# Patient Record
Sex: FEMALE | Race: WHITE | NOT HISPANIC OR LATINO | Employment: UNEMPLOYED | URBAN - METROPOLITAN AREA
[De-identification: names, ages, dates, MRNs, and addresses within clinical notes are randomized per-mention and may not be internally consistent; named-entity substitution may affect disease eponyms.]

---

## 2017-03-23 ENCOUNTER — HOSPITAL ENCOUNTER (OUTPATIENT)
Dept: SLEEP MEDICINE | Facility: HOSPITAL | Age: 52
Discharge: HOME | End: 2017-03-23
Attending: INTERNAL MEDICINE | Admitting: INTERNAL MEDICINE

## 2017-04-13 ENCOUNTER — HOSPITAL ENCOUNTER (OUTPATIENT)
Dept: SLEEP MEDICINE | Facility: HOSPITAL | Age: 52
Discharge: HOME | End: 2017-04-13
Attending: INTERNAL MEDICINE | Admitting: INTERNAL MEDICINE

## 2019-08-23 ENCOUNTER — OFFICE VISIT (OUTPATIENT)
Dept: FAMILY MEDICINE CLINIC | Facility: CLINIC | Age: 54
End: 2019-08-23

## 2019-08-23 VITALS
WEIGHT: 141 LBS | TEMPERATURE: 98.7 F | HEART RATE: 71 BPM | BODY MASS INDEX: 27.68 KG/M2 | DIASTOLIC BLOOD PRESSURE: 90 MMHG | OXYGEN SATURATION: 98 % | SYSTOLIC BLOOD PRESSURE: 146 MMHG | RESPIRATION RATE: 16 BRPM | HEIGHT: 60 IN

## 2019-08-23 DIAGNOSIS — Z12.39 SCREENING FOR BREAST CANCER: ICD-10-CM

## 2019-08-23 DIAGNOSIS — Z01.419 ENCOUNTER FOR ANNUAL ROUTINE GYNECOLOGICAL EXAMINATION: ICD-10-CM

## 2019-08-23 DIAGNOSIS — Z00.00 PREVENTATIVE HEALTH CARE: Primary | ICD-10-CM

## 2019-08-23 PROBLEM — K21.9 GASTROESOPHAGEAL REFLUX DISEASE: Status: ACTIVE | Noted: 2019-08-23

## 2019-08-23 LAB
ALBUMIN SERPL-MCNC: 4.3 G/DL (ref 3.5–4.8)
ALBUMIN/GLOB SERPL: 1.7 G/DL (ref 1–1.7)
ALP SERPL-CCNC: 71 U/L (ref 32–91)
ALT SERPL W P-5'-P-CCNC: 14 U/L (ref 14–54)
ANION GAP SERPL CALCULATED.3IONS-SCNC: 16.4 MMOL/L (ref 5–15)
ARTICHOKE IGE QN: 83 MG/DL (ref 0–100)
AST SERPL-CCNC: 19 U/L (ref 15–41)
BASOPHILS # BLD AUTO: 0 10*3/MM3 (ref 0–0.2)
BASOPHILS NFR BLD AUTO: 0.5 % (ref 0–1.5)
BILIRUB SERPL-MCNC: 0.9 MG/DL (ref 0.3–1.2)
BUN BLD-MCNC: 17 MG/DL (ref 8–20)
BUN/CREAT SERPL: 21.3 (ref 5.4–26.2)
CALCIUM SPEC-SCNC: 9.4 MG/DL (ref 8.9–10.3)
CHLORIDE SERPL-SCNC: 102 MMOL/L (ref 101–111)
CHOLEST SERPL-MCNC: 153 MG/DL
CO2 SERPL-SCNC: 26 MMOL/L (ref 22–32)
CREAT BLD-MCNC: 0.8 MG/DL (ref 0.4–1)
DEPRECATED RDW RBC AUTO: 45.5 FL (ref 37–54)
EOSINOPHIL # BLD AUTO: 0.1 10*3/MM3 (ref 0–0.4)
EOSINOPHIL NFR BLD AUTO: 2.6 % (ref 0.3–6.2)
ERYTHROCYTE [DISTWIDTH] IN BLOOD BY AUTOMATED COUNT: 14.4 % (ref 12.3–15.4)
GFR SERPL CREATININE-BSD FRML MDRD: 75 ML/MIN/1.73
GLOBULIN UR ELPH-MCNC: 2.6 GM/DL (ref 2.5–3.8)
GLUCOSE BLD-MCNC: 94 MG/DL (ref 65–99)
HCT VFR BLD AUTO: 45.9 % (ref 34–46.6)
HDLC SERPL QL: 2.47
HDLC SERPL-MCNC: 62 MG/DL
HGB BLD-MCNC: 15.4 G/DL (ref 12–15.9)
LDLC/HDLC SERPL: 1.33 {RATIO}
LYMPHOCYTES # BLD AUTO: 2 10*3/MM3 (ref 0.7–3.1)
LYMPHOCYTES NFR BLD AUTO: 38.7 % (ref 19.6–45.3)
MCH RBC QN AUTO: 30.2 PG (ref 26.6–33)
MCHC RBC AUTO-ENTMCNC: 33.6 G/DL (ref 31.5–35.7)
MCV RBC AUTO: 89.9 FL (ref 79–97)
MONOCYTES # BLD AUTO: 0.5 10*3/MM3 (ref 0.1–0.9)
MONOCYTES NFR BLD AUTO: 9 % (ref 5–12)
NEUTROPHILS # BLD AUTO: 2.6 10*3/MM3 (ref 1.7–7)
NEUTROPHILS NFR BLD AUTO: 49.2 % (ref 42.7–76)
NRBC BLD AUTO-RTO: 0.1 /100 WBC (ref 0–0.2)
PLATELET # BLD AUTO: 279 10*3/MM3 (ref 140–450)
PMV BLD AUTO: 9 FL (ref 6–12)
POTASSIUM BLD-SCNC: 4.4 MMOL/L (ref 3.6–5.1)
PROT SERPL-MCNC: 6.9 G/DL (ref 6.1–7.9)
RBC # BLD AUTO: 5.11 10*6/MM3 (ref 3.77–5.28)
SODIUM BLD-SCNC: 140 MMOL/L (ref 136–144)
TRIGL SERPL-MCNC: 43 MG/DL
VLDLC SERPL-MCNC: 8.6 MG/DL
WBC NRBC COR # BLD: 5.2 10*3/MM3 (ref 3.4–10.8)

## 2019-08-23 PROCEDURE — 87624 HPV HI-RISK TYP POOLED RSLT: CPT | Performed by: INTERNAL MEDICINE

## 2019-08-23 PROCEDURE — 80053 COMPREHEN METABOLIC PANEL: CPT | Performed by: INTERNAL MEDICINE

## 2019-08-23 PROCEDURE — 85025 COMPLETE CBC W/AUTO DIFF WBC: CPT | Performed by: INTERNAL MEDICINE

## 2019-08-23 PROCEDURE — G0148 SCR C/V CYTO, AUTOSYS, RESCR: HCPCS

## 2019-08-23 PROCEDURE — 82306 VITAMIN D 25 HYDROXY: CPT | Performed by: INTERNAL MEDICINE

## 2019-08-23 PROCEDURE — 99396 PREV VISIT EST AGE 40-64: CPT | Performed by: INTERNAL MEDICINE

## 2019-08-23 PROCEDURE — 80061 LIPID PANEL: CPT | Performed by: INTERNAL MEDICINE

## 2019-08-23 NOTE — PROGRESS NOTES
"Rooming Tab(CC,VS,Pt Hx,Fall Screen)  Chief Complaint   Patient presents with   • Annual Exam   • Gynecologic Exam       Subjective   Pt here for physical- has dropped 40+ lbs this year- goal is 50 pounds sleeping better, feels much better- even nails very long. Down 2 pant sizes.    No chest pain or difficulty, no GERD, no joint pain. No moles changing. Upset BP still up  I have reviewed and updated her medications, medical history and problem list during today's office visit.     Patient Care Team:  Alesia Gutierrez MD as PCP - General    Problem List Tab  Medications Tab  Synopsis Tab  Chart Review Tab  Care Everywhere Tab  Immunizations Tab  Patient History Tab    Social History     Tobacco Use   • Smoking status: Never Smoker   • Smokeless tobacco: Never Used   Substance Use Topics   • Alcohol use: No     Frequency: Never       Review of Systems   Constitutional: Negative for fatigue and fever.   HENT: Negative for congestion.    Respiratory: Negative for apnea, cough and wheezing.    Cardiovascular: Negative for chest pain.   Gastrointestinal: Negative for abdominal distention.   Neurological: Negative for syncope.   Psychiatric/Behavioral: Negative for behavioral problems.       Objective     Rooming Tab(CC,VS,Pt Hx,Fall Screen)  /90 (BP Location: Right arm, Patient Position: Sitting, Cuff Size: Adult)   Pulse 71   Temp 98.7 °F (37.1 °C) (Oral)   Resp 16   Ht 152.4 cm (60\")   Wt 64 kg (141 lb)   SpO2 98%   BMI 27.54 kg/m²     Body mass index is 27.54 kg/m².    Physical Exam   Constitutional: She is oriented to person, place, and time. She appears well-developed and well-nourished.   HENT:   Head: Normocephalic and atraumatic.   Right Ear: Tympanic membrane normal.   Left Ear: Tympanic membrane normal.   Eyes: Pupils are equal, round, and reactive to light.   Neck: Normal range of motion. Neck supple.   Cardiovascular: Normal rate and regular rhythm.   No murmur heard.  Pulmonary/Chest: " Effort normal and breath sounds normal. Right breast exhibits no inverted nipple, no mass and no nipple discharge. Left breast exhibits no inverted nipple, no mass and no nipple discharge.   Abdominal: Soft. Bowel sounds are normal. She exhibits no distension.   Genitourinary: Rectum normal and uterus normal. Cervix exhibits no motion tenderness. Right adnexum displays no mass. Left adnexum displays no mass. No erythema in the vagina.   Genitourinary Comments: Vaginal atrophy   Neurological: She is oriented to person, place, and time.   Skin: Capillary refill takes less than 2 seconds.   Nursing note and vitals reviewed.       Statin Choice Calculator  Data Reviewed:               Lab Results   Component Value Date    BUN 17 08/23/2019    CREATININE 0.80 08/23/2019    EGFRIFNONA 75 08/23/2019     08/23/2019    K 4.4 08/23/2019     08/23/2019    CALCIUM 9.4 08/23/2019    ALBUMIN 4.30 08/23/2019    BILITOT 0.9 08/23/2019    ALKPHOS 71 08/23/2019    AST 19 08/23/2019    ALT 14 08/23/2019    TRIG 43 08/23/2019    HDL 62 08/23/2019    VLDL 8.6 08/23/2019    LDL 83 08/23/2019    LDLHDL 1.33 08/23/2019    WBC 5.20 08/23/2019    RBC 5.11 08/23/2019    HCT 45.9 08/23/2019    MCV 89.9 08/23/2019    MCH 30.2 08/23/2019    QEUF25QH 40.4 08/23/2019      Assessment/Plan   Order Review Tab  Health Maintenance Tab  Patient Plan/Order Tab  Diagnoses and all orders for this visit:    1. Preventative health care (Primary)  Assessment & Plan:  Discussed al recommendations for her age    Orders:  -     Lipid Panel  -     PapIG HPV Age Gdln ACOG  -     Comprehensive Metabolic Panel  -     CBC & Differential  -     Vitamin D 25 Hydroxy  -     CBC Auto Differential    2. Screening for breast cancer  Assessment & Plan:  Discussed al recommendations for her age    Orders:  -     Mammo Screening Digital Tomosynthesis Bilateral With CAD; Future  -     Colonoscopy  -     Ambulatory Referral For Screening Colonoscopy    3. Encounter  for annual routine gynecological examination      Wrapup Tab  Return in about 1 year (around 8/23/2020).         During this visit for their annual exam, we reviewed their personal history, social history and family history.  We went over their medications and all the recommended health maintenence items for their age group. They were given the opportunity to ask questions and discuss other concerns.

## 2019-08-26 LAB — 25(OH)D3 SERPL-MCNC: 40.4 NG/ML (ref 30–100)

## 2019-08-27 LAB
AGE GDLN ACOG TESTING: NORMAL
CHROM ANALY OVERALL INTERP-IMP: NORMAL
CONV .: NORMAL
CONV PERFORMED BY:: NORMAL
DX ICD CODE: NORMAL
HIV 1 & 2 AB SER-IMP: NORMAL
HPV I/H RISK 1 DNA CVX QL PROBE+SIG AMP: NEGATIVE
REF LAB TEST METHOD: NORMAL
STAT OF ADQ CVX/VAG CYTO-IMP: NORMAL

## 2019-08-29 DIAGNOSIS — Z12.39 SCREENING FOR BREAST CANCER: ICD-10-CM

## 2019-09-19 ENCOUNTER — OFFICE (AMBULATORY)
Dept: URBAN - METROPOLITAN AREA PATHOLOGY 4 | Facility: PATHOLOGY | Age: 54
End: 2019-09-19
Payer: COMMERCIAL

## 2019-09-19 ENCOUNTER — ON CAMPUS - OUTPATIENT (AMBULATORY)
Dept: URBAN - METROPOLITAN AREA HOSPITAL 2 | Facility: HOSPITAL | Age: 54
End: 2019-09-19
Payer: COMMERCIAL

## 2019-09-19 VITALS
HEART RATE: 75 BPM | SYSTOLIC BLOOD PRESSURE: 106 MMHG | RESPIRATION RATE: 18 BRPM | HEART RATE: 85 BPM | DIASTOLIC BLOOD PRESSURE: 53 MMHG | OXYGEN SATURATION: 100 % | DIASTOLIC BLOOD PRESSURE: 69 MMHG | OXYGEN SATURATION: 94 % | SYSTOLIC BLOOD PRESSURE: 124 MMHG | SYSTOLIC BLOOD PRESSURE: 107 MMHG | HEIGHT: 60 IN | SYSTOLIC BLOOD PRESSURE: 117 MMHG | HEART RATE: 87 BPM | OXYGEN SATURATION: 99 % | HEART RATE: 68 BPM | HEART RATE: 89 BPM | DIASTOLIC BLOOD PRESSURE: 71 MMHG | TEMPERATURE: 98.7 F | WEIGHT: 137 LBS | DIASTOLIC BLOOD PRESSURE: 73 MMHG | HEART RATE: 93 BPM | DIASTOLIC BLOOD PRESSURE: 56 MMHG | SYSTOLIC BLOOD PRESSURE: 101 MMHG | RESPIRATION RATE: 19 BRPM | OXYGEN SATURATION: 97 % | HEART RATE: 80 BPM | SYSTOLIC BLOOD PRESSURE: 135 MMHG | DIASTOLIC BLOOD PRESSURE: 76 MMHG | OXYGEN SATURATION: 98 % | SYSTOLIC BLOOD PRESSURE: 105 MMHG

## 2019-09-19 DIAGNOSIS — Z12.11 ENCOUNTER FOR SCREENING FOR MALIGNANT NEOPLASM OF COLON: ICD-10-CM

## 2019-09-19 DIAGNOSIS — D12.5 BENIGN NEOPLASM OF SIGMOID COLON: ICD-10-CM

## 2019-09-19 DIAGNOSIS — K64.1 SECOND DEGREE HEMORRHOIDS: ICD-10-CM

## 2019-09-19 LAB
GI HISTOLOGY: A. UNSPECIFIED: (no result)
GI HISTOLOGY: PDF REPORT: (no result)

## 2019-09-19 PROCEDURE — 88305 TISSUE EXAM BY PATHOLOGIST: CPT | Mod: 33 | Performed by: INTERNAL MEDICINE

## 2019-09-19 PROCEDURE — 45385 COLONOSCOPY W/LESION REMOVAL: CPT | Performed by: INTERNAL MEDICINE

## 2024-06-12 ENCOUNTER — OFFICE VISIT (OUTPATIENT)
Dept: FAMILY MEDICINE CLINIC | Facility: CLINIC | Age: 59
End: 2024-06-12
Payer: COMMERCIAL

## 2024-06-12 VITALS
HEART RATE: 80 BPM | HEIGHT: 60 IN | OXYGEN SATURATION: 100 % | BODY MASS INDEX: 34.63 KG/M2 | WEIGHT: 176.38 LBS | DIASTOLIC BLOOD PRESSURE: 68 MMHG | RESPIRATION RATE: 16 BRPM | SYSTOLIC BLOOD PRESSURE: 124 MMHG

## 2024-06-12 DIAGNOSIS — Z12.31 ENCOUNTER FOR SCREENING MAMMOGRAM FOR MALIGNANT NEOPLASM OF BREAST: ICD-10-CM

## 2024-06-12 DIAGNOSIS — Z12.11 SCREENING FOR COLON CANCER: ICD-10-CM

## 2024-06-12 DIAGNOSIS — Z00.00 ENCOUNTER FOR ANNUAL PHYSICAL EXAM: Primary | ICD-10-CM

## 2024-06-12 PROBLEM — K21.9 GASTROESOPHAGEAL REFLUX DISEASE: Status: RESOLVED | Noted: 2019-08-23 | Resolved: 2024-06-12

## 2024-06-12 NOTE — PROGRESS NOTES
"[facility]  New Patient Visit  06/12/2024     Patient Name: Forest Mejia   YOB: 1965   Medical Record Number: [id number]   Primary Care Physician: Milagros Paul MD     Subjective   Chief Complaint     Chief Complaint   Patient presents with    [facility]       Forest Mejia is a 58 y.o. female who presents to clinic to establish [facility].     History of Present Illness       Mammogram due; wants at [facility]    Colonoscopy due September 2024 (5 years)    Diet: lost weight several years ago  Not consistent exercise  Likes riding bikes (riding 220 miles upcoming trip)  - Fall going on cruise    Fasting today to get labs    No other concerns      Review of Systems   A medically appropriate and patient-specific review of systems was performed. Pertinent findings are mentioned in the HPI, with no additional significant findings beyond those already noted.    Patient History   The following portions of the patient's history were reviewed and updated as appropriate:   allergies, current medications, problem list, PMHx, PSHx, PFHx, past social history      Objective   Vitals     Vitals:    06/12/24 1029   BP: 124/68   Pulse: 80   Resp: 16   SpO2: 100%   Weight: 80 kg (176 lb 6 oz)   Height: 152.4 cm (60\")      BMI Readings from Last 3 Encounters:   06/12/24 34.45 kg/m²   08/23/19 27.54 kg/m²     BMI is >= 30 and <35. (Class 1 Obesity). The following options were offered after discussion;: exercise counseling/recommendations and nutrition counseling/recommendations          Physical Exam   Constitutional: Alert, well-appearing, no acute distress  HENT: NCAT, mucous membranes moist  Neck: Supple, no thyromegaly  Respiratory: No respiratory distress, good effort and air entry, clear to auscultation bilaterally   Cardiovascular: RRR, no LE edema  Psychiatric: Appropriate mood and affect, cooperative  Skin: No apparent rashes or lesions        Assessment & Plan "       Diagnoses and all orders for this visit:    Encounter for annual physical exam  Comments:  Mammogram and colonoscopy ordered today.  Patient will return at her convenience for routine labs. Counseled patient on diet, exercise, weight, vaccines, disease/screening prevention, safety, risk reduction, dental/vision care, and mental health.    Screening for colon cancer  -     Ambulatory Referral For Screening Colonoscopy    Encounter for screening mammogram for malignant neoplasm of breast  -     Mammo Screening Digital Tomosynthesis Bilateral With CAD; Future      Follow Up   Return in about 1 year (around 6/12/2025) for Annual physical.    Patient was given instructions and counseling regarding her condition or for health maintenance advice. Please see specific information pulled into the AVS if appropriate.       This medical documentation was produced in part using speech recognition software (Dragon Dictation System) and may contain errors related to that system including errors in grammar, punctuation, and spelling, as well as words and phrases that may be inappropriate and not intentional --- If there are any questions or concerns please feel free to contact me, the dictating provider, for clarification.        Disclaimer For Patients: Per the Federal 21st Century CURES Act and as of April 2021, medical records (including provider notes and laboratory/imaging results) are to be made available to patient’s and/or their designees as soon as the documents are signed/resulted. While the intention is to ensure transparency and to engage patients in their healthcare, this immediate access may create unintended consequences. This document uses language intended for communication between medical experts and diagnostic results are often interpreted with the entirety of the patient’s clinical picture in mind. It is recommended that patients and/or their designees review all available information with their primary or  specialist providers for explanation and guidance to avoid misinterpretation based on layperson understanding, non-medical expert opinions, or internet searches.      Milagros Paul MD

## 2024-06-13 ENCOUNTER — LAB (OUTPATIENT)
Dept: FAMILY MEDICINE CLINIC | Facility: CLINIC | Age: 59
End: 2024-06-13
Payer: COMMERCIAL

## 2024-06-13 DIAGNOSIS — Z00.00 ENCOUNTER FOR ANNUAL PHYSICAL EXAM: Primary | ICD-10-CM

## 2024-06-13 DIAGNOSIS — Z78.0 POSTMENOPAUSAL: ICD-10-CM

## 2024-06-13 DIAGNOSIS — Z23 NEED FOR TETANUS, DIPHTHERIA, AND ACELLULAR PERTUSSIS (TDAP) VACCINE: ICD-10-CM

## 2024-06-13 LAB
BASOPHILS # BLD AUTO: 0.05 10*3/MM3 (ref 0–0.2)
BASOPHILS NFR BLD AUTO: 0.5 % (ref 0–1.5)
DEPRECATED RDW RBC AUTO: 42.5 FL (ref 37–54)
EOSINOPHIL # BLD AUTO: 0.21 10*3/MM3 (ref 0–0.4)
EOSINOPHIL NFR BLD AUTO: 2.1 % (ref 0.3–6.2)
ERYTHROCYTE [DISTWIDTH] IN BLOOD BY AUTOMATED COUNT: 12.9 % (ref 12.3–15.4)
HCT VFR BLD AUTO: 42.9 % (ref 34–46.6)
HGB BLD-MCNC: 14.1 G/DL (ref 12–15.9)
IMM GRANULOCYTES # BLD AUTO: 0.08 10*3/MM3 (ref 0–0.05)
IMM GRANULOCYTES NFR BLD AUTO: 0.8 % (ref 0–0.5)
LYMPHOCYTES # BLD AUTO: 3.8 10*3/MM3 (ref 0.7–3.1)
LYMPHOCYTES NFR BLD AUTO: 38.9 % (ref 19.6–45.3)
MCH RBC QN AUTO: 29.5 PG (ref 26.6–33)
MCHC RBC AUTO-ENTMCNC: 32.9 G/DL (ref 31.5–35.7)
MCV RBC AUTO: 89.7 FL (ref 79–97)
MONOCYTES # BLD AUTO: 0.76 10*3/MM3 (ref 0.1–0.9)
MONOCYTES NFR BLD AUTO: 7.8 % (ref 5–12)
NEUTROPHILS NFR BLD AUTO: 4.87 10*3/MM3 (ref 1.7–7)
NEUTROPHILS NFR BLD AUTO: 49.9 % (ref 42.7–76)
NRBC BLD AUTO-RTO: 0 /100 WBC (ref 0–0.2)
PLATELET # BLD AUTO: 345 10*3/MM3 (ref 140–450)
PMV BLD AUTO: 10.4 FL (ref 6–12)
RBC # BLD AUTO: 4.78 10*6/MM3 (ref 3.77–5.28)
TSH SERPL DL<=0.05 MIU/L-ACNC: 3.83 UIU/ML (ref 0.27–4.2)
WBC NRBC COR # BLD AUTO: 9.77 10*3/MM3 (ref 3.4–10.8)

## 2024-06-13 PROCEDURE — 80061 LIPID PANEL: CPT | Performed by: STUDENT IN AN ORGANIZED HEALTH CARE EDUCATION/TRAINING PROGRAM

## 2024-06-13 PROCEDURE — 90471 IMMUNIZATION ADMIN: CPT | Performed by: STUDENT IN AN ORGANIZED HEALTH CARE EDUCATION/TRAINING PROGRAM

## 2024-06-13 PROCEDURE — 36415 COLL VENOUS BLD VENIPUNCTURE: CPT

## 2024-06-13 PROCEDURE — 80050 GENERAL HEALTH PANEL: CPT | Performed by: STUDENT IN AN ORGANIZED HEALTH CARE EDUCATION/TRAINING PROGRAM

## 2024-06-13 PROCEDURE — 83036 HEMOGLOBIN GLYCOSYLATED A1C: CPT | Performed by: STUDENT IN AN ORGANIZED HEALTH CARE EDUCATION/TRAINING PROGRAM

## 2024-06-13 PROCEDURE — 82306 VITAMIN D 25 HYDROXY: CPT | Performed by: STUDENT IN AN ORGANIZED HEALTH CARE EDUCATION/TRAINING PROGRAM

## 2024-06-13 PROCEDURE — 86803 HEPATITIS C AB TEST: CPT | Performed by: STUDENT IN AN ORGANIZED HEALTH CARE EDUCATION/TRAINING PROGRAM

## 2024-06-13 PROCEDURE — 90715 TDAP VACCINE 7 YRS/> IM: CPT | Performed by: STUDENT IN AN ORGANIZED HEALTH CARE EDUCATION/TRAINING PROGRAM

## 2024-06-14 LAB
25(OH)D3 SERPL-MCNC: 30.9 NG/ML (ref 30–100)
ALBUMIN SERPL-MCNC: 4.5 G/DL (ref 3.5–5.2)
ALBUMIN/GLOB SERPL: 1.7 G/DL
ALP SERPL-CCNC: 96 U/L (ref 39–117)
ALT SERPL W P-5'-P-CCNC: 21 U/L (ref 1–33)
ANION GAP SERPL CALCULATED.3IONS-SCNC: 11 MMOL/L (ref 5–15)
AST SERPL-CCNC: 16 U/L (ref 1–32)
BILIRUB SERPL-MCNC: 0.4 MG/DL (ref 0–1.2)
BUN SERPL-MCNC: 21 MG/DL (ref 6–20)
BUN/CREAT SERPL: 25.3 (ref 7–25)
CALCIUM SPEC-SCNC: 9.5 MG/DL (ref 8.6–10.5)
CHLORIDE SERPL-SCNC: 104 MMOL/L (ref 98–107)
CHOLEST SERPL-MCNC: 182 MG/DL (ref 0–200)
CO2 SERPL-SCNC: 25 MMOL/L (ref 22–29)
CREAT SERPL-MCNC: 0.83 MG/DL (ref 0.57–1)
EGFRCR SERPLBLD CKD-EPI 2021: 81.3 ML/MIN/1.73
GLOBULIN UR ELPH-MCNC: 2.7 GM/DL
GLUCOSE SERPL-MCNC: 91 MG/DL (ref 65–99)
HBA1C MFR BLD: 5.6 % (ref 4.8–5.6)
HCV AB SER QL: NORMAL
HDLC SERPL-MCNC: 62 MG/DL (ref 40–60)
LDLC SERPL CALC-MCNC: 98 MG/DL (ref 0–100)
LDLC/HDLC SERPL: 1.53 {RATIO}
POTASSIUM SERPL-SCNC: 4.1 MMOL/L (ref 3.5–5.2)
PROT SERPL-MCNC: 7.2 G/DL (ref 6–8.5)
SODIUM SERPL-SCNC: 140 MMOL/L (ref 136–145)
TRIGL SERPL-MCNC: 126 MG/DL (ref 0–150)
VLDLC SERPL-MCNC: 22 MG/DL (ref 5–40)

## 2024-07-02 ENCOUNTER — HOSPITAL ENCOUNTER (OUTPATIENT)
Dept: MAMMOGRAPHY | Facility: HOSPITAL | Age: 59
Discharge: HOME | End: 2024-07-02
Admitting: STUDENT IN AN ORGANIZED HEALTH CARE EDUCATION/TRAINING PROGRAM
Payer: COMMERCIAL

## 2024-07-02 DIAGNOSIS — Z12.31 ENCOUNTER FOR SCREENING MAMMOGRAM FOR MALIGNANT NEOPLASM OF BREAST: ICD-10-CM

## 2024-07-02 PROCEDURE — 77067 SCR MAMMO BI INCL CAD: CPT

## 2024-07-02 PROCEDURE — 77063 BREAST TOMOSYNTHESIS BI: CPT

## 2024-11-07 ENCOUNTER — OFFICE VISIT (OUTPATIENT)
Dept: FAMILY MEDICINE CLINIC | Facility: CLINIC | Age: 59
End: 2024-11-07
Payer: COMMERCIAL

## 2024-11-07 VITALS
HEIGHT: 60 IN | OXYGEN SATURATION: 93 % | BODY MASS INDEX: 35.02 KG/M2 | SYSTOLIC BLOOD PRESSURE: 134 MMHG | HEART RATE: 100 BPM | DIASTOLIC BLOOD PRESSURE: 88 MMHG | WEIGHT: 178.4 LBS | RESPIRATION RATE: 18 BRPM

## 2024-11-07 DIAGNOSIS — J40 BRONCHITIS: Primary | ICD-10-CM

## 2024-11-07 PROCEDURE — 99213 OFFICE O/P EST LOW 20 MIN: CPT | Performed by: INTERNAL MEDICINE

## 2024-11-07 RX ORDER — AZITHROMYCIN 250 MG/1
TABLET, FILM COATED ORAL
Qty: 6 TABLET | Refills: 0 | Status: SHIPPED | OUTPATIENT
Start: 2024-11-07 | End: 2024-11-12

## 2024-11-07 RX ORDER — ALBUTEROL SULFATE 90 UG/1
2 INHALANT RESPIRATORY (INHALATION) EVERY 4 HOURS PRN
Qty: 6.7 G | Refills: 0 | Status: SHIPPED | OUTPATIENT
Start: 2024-11-07

## 2024-11-07 RX ORDER — PREDNISONE 20 MG/1
40 TABLET ORAL DAILY
Qty: 10 TABLET | Refills: 0 | Status: SHIPPED | OUTPATIENT
Start: 2024-11-07

## 2024-11-07 NOTE — PROGRESS NOTES
Chief Complaint  Wheezing and Cough    HPI:    Forest Mejia presents to [facility]    Patient is a 59-year-old female presenting for evaluation of upper respiratory symptoms.    Patient reports that she started with runny nose, congestion, cough, and occasional sore throat, especially in the morning that started a couple weeks ago.  Continues to have persistent symptoms, especially congestion, cough, and occasional wheezing. Denies sinus pain/pressure, ear pain/pressure, lymphadenopathy, myalgias, headache, fatigue, or shortness of breath. Occasionally felt like she could not take a deep breath for awhile but today is so much better. Denies fever, chills, nausea, vomiting. Denies recent sick contact or travel. Patient has been trying over the counter Tussin-DM. Symptoms better with OTC medications and worse with laying down at night. Denies history of asthma, bronchitis, recurrent pneumonia, or tobacco use. No recent COVID test. Overall feeling better today but symptoms have not resolved.       Review of Systems:  ROS negative unless otherwise noted in HPI above.    Past Medical History:   Diagnosis Date    Gastroesophageal reflux disease 08/23/2019    GERD (gastroesophageal reflux disease)          Current Outpatient Medications:     albuterol sulfate  (90 Base) MCG/ACT inhaler, Inhale 2 puffs Every 4 (Four) Hours As Needed for Wheezing., Disp: 6.7 g, Rfl: 0    azithromycin (Zithromax Z-Del) 250 MG tablet, Take 2 tablets the first day, then 1 tablet daily for 4 days., Disp: 6 tablet, Rfl: 0    predniSONE (DELTASONE) 20 MG tablet, Take 2 tablets by mouth Daily., Disp: 10 tablet, Rfl: 0    Social History     Socioeconomic History    Marital status:    Tobacco Use    Smoking status: Never    Smokeless tobacco: Never   Vaping Use    Vaping status: Never Used   Substance and Sexual Activity    Alcohol use: No    Drug use: Never    Sexual activity: Defer        Objective  "  Vital Signs:  /88   Pulse 100   Resp 18   Ht 152.4 cm (60\")   Wt 80.9 kg (178 lb 6.4 oz)   SpO2 93%   BMI 34.84 kg/m²   Estimated body mass index is 34.84 kg/m² as calculated from the following:    Height as of this encounter: 152.4 cm (60\").    Weight as of this encounter: 80.9 kg (178 lb 6.4 oz).    Physical Exam:  General: Well-appearing patient, no apparent distress  HEENT: No posterior pharynx erythema, significant clear postnasal drip no tonsillar erythema or exudates, normal external auditory canals, TM normal without bulging or erythema, mild bilateral middle ear effusions  Neck: No cervical lymphadenopathy  Cardiac: Regular rate and rhythm, normal S1/S2, no murmur, rubs or gallops, no lower extremity edema  Lungs: Diffuse bilateral expiratory wheezes, especially in the bases; no crackles  Skin: No significant rashes or lesions  MSK: Grossly normal tone and strength  Neuro: Alert and oriented x3, CN II-XII grossly intact  Psych: Appropriate mood and affect    Assessment and Plan:    (J40) Bronchitis  Assessment: Patient present with approximately 2 weeks of ongoing respiratory symptoms.  Suspect most likely initially viral infection, although concern for possible bacterial infection given persistence of symptoms and continued cough and wheezing. Discussed symptomatic treatment, typical clinical course of illness, as well as signs and symptoms which would prompt reevaluation and consideration of possible imaging and additional treatment.  Plan:  - Hold on flu and COVID testing  - Prednisone 40 mg daily for 5 days  - Azithromycin as prescribed  - Albuterol as needed  - Stay well hydrated, get plenty of rest  - Symptomatic treatment including over the counter nasal decongestant, cough suppressant, Tylenol as needed  - Hold imaging for now  - Follow up if new/worsening symptoms       Patient was given instructions and counseling regarding her condition or for health maintenance advice. Please see " specific information pulled into the AVS if appropriate.       Dr Solitario Sterling   Internal Medicine Physician  [facility]  [address], [address]  [place] [address]

## 2024-11-07 NOTE — PATIENT INSTRUCTIONS
Bronchitis  Plan:  - Hold on flu and COVID testing  - Prednisone 40 mg daily for 5 days  - Azithromycin as prescribed  - Albuterol as needed  - Stay well hydrated, get plenty of rest  - Symptomatic treatment including over the counter nasal decongestant, cough suppressant, Tylenol as needed  - Hold imaging for now  - Follow up if new/worsening symptoms

## 2024-11-27 ENCOUNTER — OFFICE VISIT (OUTPATIENT)
Dept: FAMILY MEDICINE CLINIC | Facility: CLINIC | Age: 59
End: 2024-11-27
Payer: COMMERCIAL

## 2024-11-27 VITALS
WEIGHT: 178 LBS | RESPIRATION RATE: 18 BRPM | HEART RATE: 85 BPM | BODY MASS INDEX: 34.95 KG/M2 | HEIGHT: 60 IN | DIASTOLIC BLOOD PRESSURE: 72 MMHG | SYSTOLIC BLOOD PRESSURE: 124 MMHG | OXYGEN SATURATION: 95 %

## 2024-11-27 DIAGNOSIS — J45.21 MILD INTERMITTENT EXTRINSIC ASTHMA WITH ACUTE EXACERBATION: Primary | ICD-10-CM

## 2024-11-27 RX ORDER — PREDNISONE 20 MG/1
40 TABLET ORAL DAILY
Qty: 10 TABLET | Refills: 0 | Status: SHIPPED | OUTPATIENT
Start: 2024-11-27

## 2024-11-27 NOTE — PROGRESS NOTES
[facility]  11/27/2024     Patient Name: Forest Mejia   YOB: 1965   Medical Record Number: [id number]   Primary Care Physician: Milagros Paul MD     Subjective   Chief Complaint     Chief Complaint   Patient presents with    Wheezing     History of Present Illness     Saw Hallie on 11/7: azithromycin, prednisone  Better somewhat but no consistent progress since Oct    Dripping nose; 10/24    Albuterol: more recently using it several times a day  - but recently using it several times a day      She reports experiencing wheezing, which tends to worsen in the afternoon and evening. Her symptoms began on 10/24/2024, initially presenting as a runny nose. She experienced similar symptoms around the same time last year. She notes that her condition fluctuates, with good days followed by flare-ups.     She has been using her inhaler multiple times daily for the past few weeks, particularly in the evening. She uses her inhaler before bed and upon waking, but recently has needed it multiple times a day. The inhaler provides relief, but sometimes she cannot go 4 hours without it. She also uses Primatene pills occasionally. She has tried Claritin for her symptoms.    She was previously prescribed prednisone and Z-Del for 5 days. Her symptoms have improved since her last visit, with normal breathing and the ability to sleep lying down. However, the wheezing persists. She notes that the wheezing sometimes triggers a cough.    She reports feeling as though her neck is swollen, but she does not have a fever or sore throat. She has no known allergies and does not experience shortness of breath during the summer months.    FAMILY HISTORY  Her father and grandfather had asthma.        Review of Systems   A medically appropriate and patient-specific review of systems was performed. Pertinent findings are mentioned in the HPI, with no additional significant findings beyond those  "already noted.    Patient History   The following portions of the patient's history were reviewed and updated as appropriate:   allergies, current medications, problem list, PMHx, PSHx, PFHx, past social history      Objective   Vitals     Vitals:    11/27/24 0838   BP: 124/72   Pulse: 85   Resp: 18   SpO2: 95%   Weight: 80.7 kg (178 lb)   Height: 152.4 cm (60\")      BMI Readings from Last 3 Encounters:   11/27/24 34.76 kg/m²   11/07/24 34.84 kg/m²   06/12/24 34.45 kg/m²                Physical Exam   Constitutional: Alert, well-appearing, no acute distress  HENT: NCAT, mucous membranes moist, bilateral TM normal, oropharynx normal  Neck: Supple, no thyromegaly  Respiratory: No respiratory distress, good effort and air entry, mild end expiratory wheezing in all lung fields, no crackles  Cardiovascular: RRR, no LE edema  Psychiatric: Appropriate mood and affect, cooperative  Skin: No apparent rashes or lesions        Assessment & Plan        Mild intermittent extrinsic asthma with acute exacerbation  Chronic with exacerbation  Wheezing has been persistent, worsening in the afternoon and evening. It improves with the use of an albuterol inhaler, but the patient has been using it multiple times a day recently. The patient was previously treated with prednisone and a Z-Del, which improved symptoms initially, but the wheezing persisted. The patient reports a similar episode last year around the same time, suggesting a possible seasonal pattern. Adult-onset asthma was discussed as a potential diagnosis, especially given the family history of asthma. The patient was advised that allergies can trigger asthma responses. If the new inhaler and allergy medications do not provide relief, further evaluation and possibly a maintenance inhaler may be considered.  PLAN:   - A new inhaler, Airsupra, which contains albuterol and a steroid, will be prescribed. She was advised to rinse her mouth after using the steroid inhaler. A trial " of allergy medication, such as Allegra, Zyrtec, or Xyzal, was recommended to address potential allergy triggers. Another round of steroids will be prescribed to reduce inflammation. If symptoms do not improve, a reevaluation will be necessary.  Orders:    Albuterol-Budesonide 90-80 MCG/ACT aerosol; Inhale 2 puffs Every 4 (Four) to 6 (Six) Hours As Needed (wheezing, shortness of breath).    predniSONE (DELTASONE) 20 MG tablet; Take 2 tablets by mouth Daily.          Follow Up   Follow up PRN if symptoms persist or do not improve      This medical documentation was produced in part using speech recognition software (Dragon Dictation System) and may contain errors related to that system including errors in grammar, punctuation, and spelling, as well as words and phrases that may be inappropriate and not intentional --- If there are any questions or concerns please feel free to contact me, the dictating provider, for clarification.        Disclaimer For Patients: Per the Federal 21st Century CURES Act and as of April 2021, medical records (including provider notes and laboratory/imaging results) are to be made available to patient’s and/or their designees as soon as the documents are signed/resulted. While the intention is to ensure transparency and to engage patients in their healthcare, this immediate access may create unintended consequences. This document uses language intended for communication between medical experts and diagnostic results are often interpreted with the entirety of the patient’s clinical picture in mind. It is recommended that patients and/or their designees review all available information with their primary or specialist providers for explanation and guidance to avoid misinterpretation based on layperson understanding, non-medical expert opinions, or internet searches.      Milagros Paul MD

## 2025-03-19 PROBLEM — D12.5 BENIGN NEOPLASM OF SIGMOID COLON: Status: ACTIVE | Noted: 2019-09-19

## 2025-04-10 ENCOUNTER — ON CAMPUS - OUTPATIENT (AMBULATORY)
Dept: URBAN - METROPOLITAN AREA HOSPITAL 2 | Facility: HOSPITAL | Age: 60
End: 2025-04-10
Payer: COMMERCIAL

## 2025-04-10 ENCOUNTER — OFFICE (AMBULATORY)
Dept: URBAN - METROPOLITAN AREA PATHOLOGY 19 | Facility: PATHOLOGY | Age: 60
End: 2025-04-10
Payer: COMMERCIAL

## 2025-04-10 VITALS
DIASTOLIC BLOOD PRESSURE: 77 MMHG | DIASTOLIC BLOOD PRESSURE: 109 MMHG | WEIGHT: 182 LBS | SYSTOLIC BLOOD PRESSURE: 137 MMHG | OXYGEN SATURATION: 99 % | SYSTOLIC BLOOD PRESSURE: 132 MMHG | HEART RATE: 96 BPM | HEART RATE: 105 BPM | DIASTOLIC BLOOD PRESSURE: 90 MMHG | SYSTOLIC BLOOD PRESSURE: 140 MMHG | SYSTOLIC BLOOD PRESSURE: 118 MMHG | HEART RATE: 100 BPM | HEART RATE: 86 BPM | SYSTOLIC BLOOD PRESSURE: 125 MMHG | DIASTOLIC BLOOD PRESSURE: 71 MMHG | OXYGEN SATURATION: 97 % | HEART RATE: 83 BPM | DIASTOLIC BLOOD PRESSURE: 85 MMHG | RESPIRATION RATE: 17 BRPM | TEMPERATURE: 96.8 F | RESPIRATION RATE: 14 BRPM | HEIGHT: 60 IN | DIASTOLIC BLOOD PRESSURE: 86 MMHG | RESPIRATION RATE: 18 BRPM | DIASTOLIC BLOOD PRESSURE: 87 MMHG | OXYGEN SATURATION: 98 % | SYSTOLIC BLOOD PRESSURE: 138 MMHG | HEART RATE: 89 BPM | SYSTOLIC BLOOD PRESSURE: 147 MMHG | DIASTOLIC BLOOD PRESSURE: 88 MMHG | SYSTOLIC BLOOD PRESSURE: 133 MMHG | HEART RATE: 88 BPM | DIASTOLIC BLOOD PRESSURE: 103 MMHG | SYSTOLIC BLOOD PRESSURE: 168 MMHG | HEART RATE: 93 BPM

## 2025-04-10 DIAGNOSIS — K64.1 SECOND DEGREE HEMORRHOIDS: ICD-10-CM

## 2025-04-10 DIAGNOSIS — Z86.0101 PERSONAL HISTORY OF ADENOMATOUS AND SERRATED COLON POLYPS: ICD-10-CM

## 2025-04-10 DIAGNOSIS — D12.3 BENIGN NEOPLASM OF TRANSVERSE COLON: ICD-10-CM

## 2025-04-10 DIAGNOSIS — Z09 ENCOUNTER FOR FOLLOW-UP EXAMINATION AFTER COMPLETED TREATMEN: ICD-10-CM

## 2025-04-10 PROBLEM — K63.5 POLYP OF COLON: Status: ACTIVE | Noted: 2025-04-10

## 2025-04-10 PROBLEM — Z86.010 SURVEILLANCE DUE TO PRIOR COLONIC NEOPLASIA: Status: ACTIVE | Noted: 2025-04-10

## 2025-04-10 LAB
GI HISTOLOGY: A. HEPATIC FLEXURE: (no result)
GI HISTOLOGY: PDF REPORT: (no result)

## 2025-04-10 PROCEDURE — 88305 TISSUE EXAM BY PATHOLOGIST: CPT | Performed by: PATHOLOGY

## 2025-04-10 PROCEDURE — 45385 COLONOSCOPY W/LESION REMOVAL: CPT | Mod: 33 | Performed by: INTERNAL MEDICINE
